# Patient Record
Sex: FEMALE | Race: WHITE
[De-identification: names, ages, dates, MRNs, and addresses within clinical notes are randomized per-mention and may not be internally consistent; named-entity substitution may affect disease eponyms.]

---

## 2021-01-22 ENCOUNTER — HOSPITAL ENCOUNTER (INPATIENT)
Dept: HOSPITAL 96 - M.ERS | Age: 76
LOS: 2 days | Discharge: HOME | DRG: 640 | End: 2021-01-24
Attending: INTERNAL MEDICINE | Admitting: INTERNAL MEDICINE
Payer: COMMERCIAL

## 2021-01-22 VITALS — HEIGHT: 60.98 IN | BODY MASS INDEX: 25.68 KG/M2 | WEIGHT: 136 LBS

## 2021-01-22 VITALS — SYSTOLIC BLOOD PRESSURE: 142 MMHG | DIASTOLIC BLOOD PRESSURE: 83 MMHG

## 2021-01-22 VITALS — SYSTOLIC BLOOD PRESSURE: 134 MMHG | DIASTOLIC BLOOD PRESSURE: 74 MMHG

## 2021-01-22 DIAGNOSIS — E87.6: ICD-10-CM

## 2021-01-22 DIAGNOSIS — R29.2: ICD-10-CM

## 2021-01-22 DIAGNOSIS — E03.9: ICD-10-CM

## 2021-01-22 DIAGNOSIS — Z79.899: ICD-10-CM

## 2021-01-22 DIAGNOSIS — E83.52: Primary | ICD-10-CM

## 2021-01-22 DIAGNOSIS — Z20.822: ICD-10-CM

## 2021-01-22 DIAGNOSIS — E86.9: ICD-10-CM

## 2021-01-22 DIAGNOSIS — J45.909: ICD-10-CM

## 2021-01-22 DIAGNOSIS — I10: ICD-10-CM

## 2021-01-22 DIAGNOSIS — Z82.49: ICD-10-CM

## 2021-01-22 DIAGNOSIS — N17.0: ICD-10-CM

## 2021-01-22 LAB
ABSOLUTE BASOPHILS: 0.1 THOU/UL (ref 0–0.2)
ABSOLUTE EOSINOPHILS: 0.1 THOU/UL (ref 0–0.7)
ABSOLUTE MONOCYTES: 0.6 THOU/UL (ref 0–1.2)
ALBUMIN SERPL-MCNC: 4.4 G/DL (ref 3.4–5)
ALP SERPL-CCNC: 100 U/L (ref 46–116)
ALT SERPL-CCNC: 37 U/L (ref 30–65)
ANION GAP SERPL CALC-SCNC: 10 MMOL/L (ref 7–16)
AST SERPL-CCNC: 35 U/L (ref 15–37)
BASOPHILS NFR BLD AUTO: 0.8 %
BILIRUB SERPL-MCNC: 0.7 MG/DL
BUN SERPL-MCNC: 27 MG/DL (ref 7–18)
CALCIUM SERPL-MCNC: 12.8 MG/DL (ref 8.5–10.1)
CALCIUM SERPL-MCNC: 13.4 MG/DL (ref 8.5–10.1)
CHLORIDE SERPL-SCNC: 98 MMOL/L (ref 98–107)
CO2 SERPL-SCNC: 30 MMOL/L (ref 21–32)
CREAT SERPL-MCNC: 1.4 MG/DL (ref 0.6–1.3)
CREAT SERPL-MCNC: 1.5 MG/DL (ref 0.6–1.3)
EOSINOPHIL NFR BLD: 0.7 %
GLUCOSE SERPL-MCNC: 109 MG/DL (ref 70–99)
GRANULOCYTES NFR BLD MANUAL: 65.4 %
HCT VFR BLD CALC: 39.8 % (ref 37–47)
HGB BLD-MCNC: 13.5 GM/DL (ref 12–15)
LYMPHOCYTES # BLD: 2.4 THOU/UL (ref 0.8–5.3)
LYMPHOCYTES NFR BLD AUTO: 26.1 %
MCH RBC QN AUTO: 31.1 PG (ref 26–34)
MCHC RBC AUTO-ENTMCNC: 34 G/DL (ref 28–37)
MCV RBC: 91.6 FL (ref 80–100)
MONOCYTES NFR BLD: 7 %
MPV: 7.7 FL. (ref 7.2–11.1)
NEUTROPHILS # BLD: 6 THOU/UL (ref 1.6–8.1)
NUCLEATED RBCS: 0 /100WBC
PHOSPHATE SERPL-MCNC: 3.5 MG/DL (ref 2.5–4.9)
PLATELET COUNT*: 389 THOU/UL (ref 150–400)
POTASSIUM SERPL-SCNC: 3.3 MMOL/L (ref 3.5–5.1)
PROT SERPL-MCNC: 8.3 G/DL (ref 6.4–8.2)
RBC # BLD AUTO: 4.34 MIL/UL (ref 4.2–5)
RDW-CV: 13.2 % (ref 10.5–14.5)
SODIUM SERPL-SCNC: 138 MMOL/L (ref 136–145)
WBC # BLD AUTO: 9.1 THOU/UL (ref 4–11)

## 2021-01-23 VITALS — SYSTOLIC BLOOD PRESSURE: 125 MMHG | DIASTOLIC BLOOD PRESSURE: 48 MMHG

## 2021-01-23 VITALS — SYSTOLIC BLOOD PRESSURE: 140 MMHG | DIASTOLIC BLOOD PRESSURE: 66 MMHG

## 2021-01-23 VITALS — SYSTOLIC BLOOD PRESSURE: 151 MMHG | DIASTOLIC BLOOD PRESSURE: 108 MMHG

## 2021-01-23 VITALS — DIASTOLIC BLOOD PRESSURE: 108 MMHG | SYSTOLIC BLOOD PRESSURE: 151 MMHG

## 2021-01-23 VITALS — SYSTOLIC BLOOD PRESSURE: 131 MMHG | DIASTOLIC BLOOD PRESSURE: 63 MMHG

## 2021-01-23 VITALS — DIASTOLIC BLOOD PRESSURE: 68 MMHG | SYSTOLIC BLOOD PRESSURE: 136 MMHG

## 2021-01-23 VITALS — DIASTOLIC BLOOD PRESSURE: 55 MMHG | SYSTOLIC BLOOD PRESSURE: 147 MMHG

## 2021-01-23 LAB
ANION GAP SERPL CALC-SCNC: 8 MMOL/L (ref 7–16)
BUN SERPL-MCNC: 24 MG/DL (ref 7–18)
CALCIUM SERPL-MCNC: 10.5 MG/DL (ref 8.5–10.1)
CHLORIDE SERPL-SCNC: 103 MMOL/L (ref 98–107)
CO2 SERPL-SCNC: 27 MMOL/L (ref 21–32)
CREAT SERPL-MCNC: 1.1 MG/DL (ref 0.6–1.3)
GLUCOSE SERPL-MCNC: 84 MG/DL (ref 70–99)
MAGNESIUM SERPL-MCNC: 1.3 MG/DL (ref 1.8–2.4)
PHOSPHATE SERPL-MCNC: 2.5 MG/DL (ref 2.5–4.9)
POTASSIUM SERPL-SCNC: 2.9 MMOL/L (ref 3.5–5.1)
SODIUM SERPL-SCNC: 138 MMOL/L (ref 136–145)

## 2021-01-23 NOTE — NUR
RECEIVED REPORT FROM ER AND PATIENT ARRIVED TO TELE FLOOR AT APPROXIMATELY
1325. ADMISSION PREVIOUSLY COMPLETED BY NOC RN. REASSESSMENT AND VS COMPLETED
AS CHARTED. CARDIAC MONITOR PLACED. MEDS PER EMAR. ELECTROLYTE REPLACEMENT IN
PROGRESS. SON AT BEDSIDE DURING VISITING HOURS. PATIENT WITH NO COMPLAINTS
THIS SHIFT. DENIES PAIN. FALL PRECAUTIONS IN PLACE AND CALL LIGHT WITHIN
REACH. PATIENT ORIENTED TO ROOM, BED, AND CALL LIGHT.

## 2021-01-24 VITALS — DIASTOLIC BLOOD PRESSURE: 62 MMHG | SYSTOLIC BLOOD PRESSURE: 122 MMHG

## 2021-01-24 VITALS — DIASTOLIC BLOOD PRESSURE: 63 MMHG | SYSTOLIC BLOOD PRESSURE: 141 MMHG

## 2021-01-24 VITALS — SYSTOLIC BLOOD PRESSURE: 141 MMHG | DIASTOLIC BLOOD PRESSURE: 63 MMHG

## 2021-01-24 LAB
ALBUMIN SERPL-MCNC: 3.4 G/DL (ref 3.4–5)
ALP SERPL-CCNC: 73 U/L (ref 46–116)
ALT SERPL-CCNC: 29 U/L (ref 30–65)
ANION GAP SERPL CALC-SCNC: 8 MMOL/L (ref 7–16)
AST SERPL-CCNC: 26 U/L (ref 15–37)
BILIRUB SERPL-MCNC: 0.5 MG/DL
BUN SERPL-MCNC: 31 MG/DL (ref 7–18)
CALCIUM SERPL-MCNC: 9.6 MG/DL (ref 8.5–10.1)
CHLORIDE SERPL-SCNC: 106 MMOL/L (ref 98–107)
CO2 SERPL-SCNC: 28 MMOL/L (ref 21–32)
CREAT SERPL-MCNC: 1.1 MG/DL (ref 0.6–1.3)
GLUCOSE SERPL-MCNC: 82 MG/DL (ref 70–99)
POTASSIUM SERPL-SCNC: 3.7 MMOL/L (ref 3.5–5.1)
PROT SERPL-MCNC: 6 G/DL (ref 6.4–8.2)
SODIUM SERPL-SCNC: 142 MMOL/L (ref 136–145)

## 2021-01-24 NOTE — NUR
ASSUMED CARE OF PT AFTER REPORT AT 1930. PT A&OX4. VSS. PHYSICAL ASSESSMENT
COMPLETED AND CHARTED. PT ON RA. PT TRACING SR ON TELE. PT UPADLIB TO
RESTROOM. PT UPADLIB TO RESTROOM. MAGNESIUM 1.2-ELECTROLYTE PROTOCOL IN PLACE.
PT DENIES ANY PAIN. CALL LIGHT WITHIN REACH.

## 2021-01-24 NOTE — NUR
RECEIED REPORT. ASSUMED CARE OF PT AROUND 0730. AM ASSESSMENT AND VITALS
COMPLETED AS CHARTED. MEDS PER EMAR. CARDIAC MONITOR IN PLACE AS CHARTED. LABS
NORMALIZING. DISCAHRGE ORDERS RECEIVED. DISCHARGE COMPLETED AS DOCUMENTED. PT
AWARE OF STOPPED MEDICATIONS AND TO FOLLOW UP WITH PCP IN ONE WEEK FOR LAB
WORK. IV AND CARDIAC MONITOR REMOVED. ALL BELONGINGS GATHERED AND SENT OUT
WITH PT. PT LEFT UNIT IN WC WITH NURSING STAFF. PT LEFT HOSPITAL IN HER OWN
CAR, DRIVING HERSLEF.

## 2021-01-24 NOTE — NUR
ASSUMED CARE OF PT AFTER REPORT AT 1930. PT A&OX4. VSS. PHYSICAL ASSESSMENT
COMPLETED AND CHARTED. PT ON RA. PT TRACING SR ON TELE. PT UPADLIB TO
RESTROOM. PT UPADLIB TO RESTROOM. MAGNESIUM 1.3-ELECTROLYTE PROTOCOL IN PLACE.
PT DENIES ANY PAIN. CALL LIGHT WITHIN REACH.

## 2021-01-25 LAB — PTH-INTACT SERPL-MCNC: 23 PG/ML (ref 15–65)

## 2021-01-25 NOTE — EKG
[place] [address]
Phone:  [phone]                     ELECTROCARDIOGRAM REPORT      
_______________________________________________________________________________
 
Name:         EYALYAYO ALINE           Room:          [address]    DIS IN 
M.R.#:    [id number]     Account #:     [id number]  
Admission:    21    Attend Phys:   Chad Garvey
Discharge:    21    Date of Birth: [birth year]  
Date of Service: 21 1656  Report #:      [id number]
        [id number]
_______________________________________________________________________________
THIS REPORT FOR:  //name//                      
 
                         [facility] ED
                                       
Test Date:    2021               Test Time:    16:56:32
Pat Name:     YAYO BIRCH        Department:   
Patient ID:   [id number]            Room:         [address]
Gender:       F                        Technician:   TRAVIS
:          1945               Requested By: Ekaterina Mcdonnell
Order Number: [id number]    Dano MD:   Oscar Bernal
                                 Measurements
Intervals                              Axis          
Rate:         104                      P:            55
AR:           180                      QRS:          32
QRSD:         86                       T:            38
QT:           329                                    
QTc:          433                                    
                           Interpretive Statements
Sinus tachycardia
No previous ECG available for comparison
Electronically Signed On 2021 14:53:31 CST by Oscar Bernal
[email or web address]
 
 
 
 
 
 
 
 
 
 
 
 
 
 
 
 
 
 
 
 
 
 
  <ELECTRONICALLY SIGNED>
                                           By: Oscar Bernal MD, [facility]   
  21     1453
D: 21 1656   _____________________________________
T: 21 1656   Oscar Bernal MD, [facility]     /EPI

## 2021-01-27 NOTE — CON
[address]  [address]                    CONSULTATION                  
_______________________________________________________________________________
 
Name:       YAYO BIRCH            Room:           [address] [place]  
M.R.#:  [id number]      Account #:      [id number]  
Admission:  01/22/21     Attend Phys:    ALINE Santos
Discharge:  01/24/21     Date of Birth:  [birth year]  
         Report #: [id number]
                                                                     [id number]  
_______________________________________________________________________________
THIS REPORT FOR:  
 
cc:  Physician not on staff        
     Physician not on staff                                               ~
     Luis Alberto Shi MD       
 
 
DATE OF SERVICE:  01/23/2021
 
 
REQUESTING PHYSICIAN:  Lalit Villalba DO
 
REASON FOR CONSULTATION:  Hypercalcemia.
 
HISTORY OF PRESENT ILLNESS:  The patient is a very pleasant 75-year-old female
who was advised by her primary care physician to come to the hospital because
her calcium was 13.
 
PAST MEDICAL HISTORY:  Positive for hypertension.  She is on amlodipine at home.
 
SOCIAL HISTORY:  No tobacco or alcohol abuse.
 
FAMILY HISTORY:  Noncontributory.
 
REVIEW OF SYSTEMS:  All systems reviewed and negative.
 
PHYSICAL EXAMINATION:
GENERAL:  Awake, alert, oriented, no acute distress.
VITAL SIGNS:  Blood pressure 150/100, heart rate 95, afebrile.
HEENT:  Pupils are round.
NECK:  Supple.
LUNGS:  Clear.
CARDIOVASCULAR:  Regular rate.
ABDOMEN:  Soft.
LOWER EXTREMITIES:  No edema.
 
LABORATORY DATA:  Her creatinine was 1.5 on admission and 1.1 now.  Her calcium
was 13.4 down to 10.5 now.
 
ASSESSMENT:
1.  Hypercalcemia due to calcium supplements and volume depletion.  Calcium is
much better, almost normal now.
2.  Acute kidney injury likely due to volume depletion and hypercalcemia.  Her
creatinine down to 1.1.
3.  Mild hypokalemia.
 
 
 
 
[address] [address] [place] [address]                    CONSULTATION                  
_______________________________________________________________________________
 
Name:       YAYO BIRCH            Room:           [address] [place]  
M.R.#:  [id number]      Account #:      [id number]  
Admission:  01/22/21     Attend Phys:    ALINE Santos
Discharge:  01/24/21     Date of Birth:  [birth year]  
         Report #: [id number]
                                                                     [id number]  
_______________________________________________________________________________
 
 
PLAN:
1.  Stop calcium supplements and do not use them in the future.
2.  No dairy products.
3.  Drink more water.
4.  Replace potassium.
 
She is doing much better from my standpoint, I will sign off.
 
 
 
 
 
 
 
 
 
 
 
 
 
 
 
 
 
 
 
 
 
 
 
 
 
 
 
 
 
 
 
 
 
 
 
<ELECTRONICALLY SIGNED>
                                        By:  Luis Alberto Shi MD      
01/27/21     0947
D: 01/23/21 0931_______________________________________
T: 01/23/21 1239Alexorion Shi MD         /PMT